# Patient Record
Sex: MALE | Race: BLACK OR AFRICAN AMERICAN | NOT HISPANIC OR LATINO | RURAL
[De-identification: names, ages, dates, MRNs, and addresses within clinical notes are randomized per-mention and may not be internally consistent; named-entity substitution may affect disease eponyms.]

---

## 2022-08-22 ENCOUNTER — HOSPITAL ENCOUNTER (EMERGENCY)
Facility: HOSPITAL | Age: 72
Discharge: HOME | End: 2022-08-22
Attending: FAMILY MEDICINE | Admitting: FAMILY MEDICINE
Payer: COMMERCIAL

## 2022-08-22 VITALS
HEIGHT: 68 IN | HEART RATE: 77 BPM | RESPIRATION RATE: 18 BRPM | OXYGEN SATURATION: 98 % | DIASTOLIC BLOOD PRESSURE: 95 MMHG | WEIGHT: 210 LBS | TEMPERATURE: 98 F | SYSTOLIC BLOOD PRESSURE: 140 MMHG | BODY MASS INDEX: 31.83 KG/M2
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
## 2022-08-22 PROCEDURE — 99282 EMERGENCY DEPT VISIT SF MDM: CPT | Mod: ,,, | Performed by: FAMILY MEDICINE

## 2022-08-22 PROCEDURE — 99281 EMR DPT VST MAYX REQ PHY/QHP: CPT

## 2022-08-22 PROCEDURE — 99282 PR EMERGENCY DEPT VISIT,LEVEL II: ICD-10-PCS | Mod: ,,, | Performed by: FAMILY MEDICINE

## 2022-08-22 RX ORDER — DAPAGLIFLOZIN 10 MG/1
10 TABLET, FILM COATED ORAL EVERY MORNING
COMMUNITY
Start: 2022-07-14

## 2022-08-22 RX ORDER — LISINOPRIL 40 MG/1
40 TABLET ORAL DAILY
COMMUNITY
Start: 2022-07-14

## 2022-08-22 RX ORDER — ROSUVASTATIN CALCIUM 40 MG/1
40 TABLET, COATED ORAL NIGHTLY
COMMUNITY
Start: 2022-07-14

## 2022-08-22 RX ORDER — INSULIN GLARGINE-YFGN 100 [IU]/ML
INJECTION, SOLUTION SUBCUTANEOUS
COMMUNITY
Start: 2022-05-25

## 2022-08-22 RX ORDER — NAPROXEN SODIUM 220 MG/1
81 TABLET, FILM COATED ORAL DAILY
COMMUNITY

## 2022-08-22 RX ORDER — METFORMIN HYDROCHLORIDE 1000 MG/1
1000 TABLET ORAL 2 TIMES DAILY
COMMUNITY
Start: 2022-05-25
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
## 2023-08-13 ENCOUNTER — HOSPITAL ENCOUNTER (EMERGENCY)
Facility: HOSPITAL | Age: 73
Discharge: HOME | End: 2023-08-13
Payer: COMMERCIAL

## 2023-08-13 VITALS
HEIGHT: 67 IN | DIASTOLIC BLOOD PRESSURE: 61 MMHG | RESPIRATION RATE: 16 BRPM | HEART RATE: 62 BPM | TEMPERATURE: 98 F | OXYGEN SATURATION: 98 % | WEIGHT: 205 LBS | SYSTOLIC BLOOD PRESSURE: 97 MMHG | BODY MASS INDEX: 32.18 KG/M2

## 2023-08-13 DIAGNOSIS — R55 SYNCOPE: ICD-10-CM

## 2023-08-13 DIAGNOSIS — E86.0 DEHYDRATION: Primary | ICD-10-CM

## 2023-08-13 LAB
ALBUMIN SERPL BCP-MCNC: 3.6 G/DL (ref 3.5–5)
ALBUMIN/GLOB SERPL: 0.9 {RATIO}
ALP SERPL-CCNC: 66 U/L (ref 45–115)
ALT SERPL W P-5'-P-CCNC: 17 U/L (ref 16–61)
ANION GAP SERPL CALCULATED.3IONS-SCNC: 20 MMOL/L (ref 7–16)
AST SERPL W P-5'-P-CCNC: 17 U/L (ref 15–37)
BASOPHILS # BLD AUTO: 0.02 K/UL (ref 0–0.2)
BASOPHILS NFR BLD AUTO: 0.3 % (ref 0–1)
BILIRUB SERPL-MCNC: 0.8 MG/DL (ref ?–1.2)
BUN SERPL-MCNC: 25 MG/DL (ref 7–18)
BUN/CREAT SERPL: 10 (ref 6–20)
CALCIUM SERPL-MCNC: 9.2 MG/DL (ref 8.5–10.1)
CHLORIDE SERPL-SCNC: 99 MMOL/L (ref 98–107)
CO2 SERPL-SCNC: 22 MMOL/L (ref 21–32)
CREAT SERPL-MCNC: 2.48 MG/DL (ref 0.7–1.3)
DIFFERENTIAL METHOD BLD: ABNORMAL
EGFR (NO RACE VARIABLE) (RUSH/TITUS): 27 ML/MIN/1.73M2
EOSINOPHIL # BLD AUTO: 0.02 K/UL (ref 0–0.5)
EOSINOPHIL NFR BLD AUTO: 0.3 % (ref 1–4)
ERYTHROCYTE [DISTWIDTH] IN BLOOD BY AUTOMATED COUNT: 13.7 % (ref 11.5–14.5)
GLOBULIN SER-MCNC: 3.9 G/DL (ref 2–4)
GLUCOSE SERPL-MCNC: 117 MG/DL (ref 70–105)
GLUCOSE SERPL-MCNC: 121 MG/DL (ref 74–106)
HCT VFR BLD AUTO: 40.7 % (ref 40–54)
HGB BLD-MCNC: 13.5 G/DL (ref 13.5–18)
LYMPHOCYTES # BLD AUTO: 2.38 K/UL (ref 1–4.8)
LYMPHOCYTES NFR BLD AUTO: 32.3 % (ref 27–41)
MAGNESIUM SERPL-MCNC: 2.1 MG/DL (ref 1.7–2.3)
MCH RBC QN AUTO: 29.5 PG (ref 27–31)
MCHC RBC AUTO-ENTMCNC: 33.2 G/DL (ref 32–36)
MCV RBC AUTO: 88.9 FL (ref 80–96)
MONOCYTES # BLD AUTO: 0.62 K/UL (ref 0–0.8)
MONOCYTES NFR BLD AUTO: 8.4 % (ref 2–6)
MPC BLD CALC-MCNC: 9.4 FL (ref 9.4–12.4)
NEUTROPHILS # BLD AUTO: 4.32 K/UL (ref 1.8–7.7)
NEUTROPHILS NFR BLD AUTO: 58.7 % (ref 53–65)
PLATELET # BLD AUTO: 252 K/UL (ref 150–400)
POTASSIUM SERPL-SCNC: 3.3 MMOL/L (ref 3.5–5.1)
PROT SERPL-MCNC: 7.5 G/DL (ref 6.4–8.2)
RBC # BLD AUTO: 4.58 M/UL (ref 4.6–6.2)
SODIUM SERPL-SCNC: 138 MMOL/L (ref 136–145)
TROPONIN I SERPL DL<=0.01 NG/ML-MCNC: 12.8 PG/ML
WBC # BLD AUTO: 7.36 K/UL (ref 4.5–11)

## 2023-08-13 PROCEDURE — 80053 COMPREHEN METABOLIC PANEL: CPT | Performed by: PHYSICIAN ASSISTANT

## 2023-08-13 PROCEDURE — 85025 COMPLETE CBC W/AUTO DIFF WBC: CPT | Performed by: PHYSICIAN ASSISTANT

## 2023-08-13 PROCEDURE — 83735 ASSAY OF MAGNESIUM: CPT | Performed by: PHYSICIAN ASSISTANT

## 2023-08-13 PROCEDURE — 84484 ASSAY OF TROPONIN QUANT: CPT | Performed by: PHYSICIAN ASSISTANT

## 2023-08-13 PROCEDURE — 93005 ELECTROCARDIOGRAM TRACING: CPT

## 2023-08-13 PROCEDURE — 25000003 PHARM REV CODE 250: Performed by: PHYSICIAN ASSISTANT

## 2023-08-13 PROCEDURE — 99284 PR EMERGENCY DEPT VISIT,LEVEL IV: ICD-10-PCS | Mod: ,,, | Performed by: PHYSICIAN ASSISTANT

## 2023-08-13 PROCEDURE — 99285 EMERGENCY DEPT VISIT HI MDM: CPT | Mod: 25

## 2023-08-13 PROCEDURE — 93010 ELECTROCARDIOGRAM REPORT: CPT | Mod: ,,, | Performed by: FAMILY MEDICINE

## 2023-08-13 PROCEDURE — 82962 GLUCOSE BLOOD TEST: CPT

## 2023-08-13 PROCEDURE — 99284 EMERGENCY DEPT VISIT MOD MDM: CPT | Mod: ,,, | Performed by: PHYSICIAN ASSISTANT

## 2023-08-13 PROCEDURE — 96360 HYDRATION IV INFUSION INIT: CPT

## 2023-08-13 PROCEDURE — 93010 EKG 12-LEAD: ICD-10-PCS | Mod: ,,, | Performed by: FAMILY MEDICINE

## 2023-08-13 RX ADMIN — SODIUM CHLORIDE 1000 ML: 9 INJECTION, SOLUTION INTRAVENOUS at 08:08

## 2023-08-14 NOTE — ED PROVIDER NOTES
Encounter Date: 8/13/2023       History     Chief Complaint   Patient presents with    Loss of Consciousness     Patient is a 72-year-old male with history of syncopal episode, he had stayed out in the heat for quite a while talking to a friend, once they got home, he stepped out of the car and fell to the ground.    He did not injure himself, denies any pain.    Two states that he feels weak, and is probably dehydrated.    He has no history of heart disease, or neurologic disorder, his wife witnessed the syncopal episode, denied any seizure activity, said that he came too quickly, and was not confused.    He has a past medical history of diabetes, hypertension, bladder cancer.    Past surgical history is positive for bladder surgery  Patient denies any tobacco, alcohol, or drug use      Review of patient's allergies indicates:  No Known Allergies  Past Medical History:   Diagnosis Date    Bladder cancer     Diabetes mellitus     Hypertension      Past Surgical History:   Procedure Laterality Date    BLADDER SURGERY  2022     History reviewed. No pertinent family history.  Social History     Tobacco Use    Smoking status: Never    Smokeless tobacco: Never   Substance Use Topics    Alcohol use: Not Currently    Drug use: Never     Review of Systems   Neurological:  Positive for syncope.   All other systems reviewed and are negative.      Physical Exam     Initial Vitals   BP Pulse Resp Temp SpO2   08/13/23 1927 08/13/23 1927 08/13/23 1927 08/13/23 2003 08/13/23 1927   (!) 95/51 78 18 98.2 °F (36.8 °C) 95 %      MAP       --                Physical Exam    Nursing note and vitals reviewed.  Constitutional: He appears well-developed and well-nourished. He is not diaphoretic. No distress.   HENT:   Head: Normocephalic and atraumatic.   Nose: Nose normal.   Mouth/Throat: Oropharynx is clear and moist.   Eyes: EOM are normal.   Neck: Neck supple.   Cardiovascular:  Normal rate, regular rhythm and normal heart sounds.            Pulmonary/Chest: Breath sounds normal.   Abdominal: Abdomen is soft. Bowel sounds are normal. There is no abdominal tenderness.   Musculoskeletal:         General: No edema.      Cervical back: Neck supple.     Neurological: He is alert and oriented to person, place, and time.   Skin: Skin is dry.   Psychiatric: He has a normal mood and affect.         Medical Screening Exam   See Full Note    ED Course   Procedures  Labs Reviewed   COMPREHENSIVE METABOLIC PANEL - Abnormal; Notable for the following components:       Result Value    Potassium 3.3 (*)     Anion Gap 20 (*)     Glucose 121 (*)     BUN 25 (*)     Creatinine 2.48 (*)     eGFR 27 (*)     All other components within normal limits   CBC WITH DIFFERENTIAL - Abnormal; Notable for the following components:    RBC 4.58 (*)     Monocytes % 8.4 (*)     Eosinophils % 0.3 (*)     All other components within normal limits   POCT GLUCOSE MONITORING CONTINUOUS - Abnormal; Notable for the following components:    POC Glucose 117 (*)     All other components within normal limits   TROPONIN I - Normal   MAGNESIUM - Normal   CBC W/ AUTO DIFFERENTIAL    Narrative:     The following orders were created for panel order CBC auto differential.  Procedure                               Abnormality         Status                     ---------                               -----------         ------                     CBC with Differential[[id number]]        Abnormal            Final result                 Please view results for these tests on the individual orders.          Imaging Results              X-Ray Chest AP Portable (Final result)  Result time 08/13/23 20:04:24      Final result by Aleksey Eckert MD (08/13/23 20:04:24)                   Impression:      No acute findings.      Electronically signed by: Aleksey Eckert  Date:    08/13/2023  Time:    20:04               Narrative:    EXAMINATION:  XR CHEST AP PORTABLE    CLINICAL HISTORY:  Syncope;    TECHNIQUE:  Single  frontal view of the chest was performed.    COMPARISON:  11/07/2017    FINDINGS:  Heart size normal. Lungs clear. No pneumothorax or pleural effusion.                                       CT Head Without Contrast (Final result)  Result time 08/13/23 19:59:56      Final result by Aleksey Eckert MD (08/13/23 19:59:56)                   Impression:      No acute intracranial abnormality.      Electronically signed by: Aleksey Eckert  Date:    08/13/2023  Time:    19:59               Narrative:    EXAMINATION:  CT HEAD WITHOUT CONTRAST    CLINICAL HISTORY:  Syncope, simple, abnormal neuro exam;Syncope;    TECHNIQUE:  CT of the head performed without the use of intravenous contrast.  The CT examination was performed using one or more of the following dose reduction techniques: Automated exposure control, adjustment of the mA and kV according to patient's size, use of acute or iterative reconstruction techniques.    COMPARISON:  None.    FINDINGS:  No acute intracranial hemorrhage, mass, infarct, or fluid collection.  Mild chronic microvascular ischemic change.  Ventricles, sulci, and cisterns appear normal.  No mass effect or midline shift.  Calvarium intact.  Mastoid air cells and paranasal sinuses clear.                                       Medications   sodium chloride 0.9% bolus 1,000 mL 1,000 mL (1,000 mLs Intravenous New Bag 8/13/23 2001)     Medical Decision Making:   Initial Assessment:   Patient is a 72-year-old male with history of syncopal episode, he had stayed out in the heat for quite a while talking to a friend, once they got home, he stepped out of the car and fell to the ground.    He did not injure himself, denies any pain.    Two states that he feels weak, and is probably dehydrated.    He has no history of heart disease, or neurologic disorder, his wife witnessed the syncopal episode, denied any seizure activity, said that he came too quickly, and was not confused.    He has a past medical history of  diabetes, hypertension, bladder cancer.    Past surgical history is positive for bladder surgery  Patient denies any tobacco, alcohol, or drug use  Differential Diagnosis:   Dehydration, NSTEMI, syncope, stroke, brain bleed  ED Management:  Patient got a CBC, CMP, troponin, magnesium, chest x-ray, head CT without contrast.    He got an IV, and a bolus of normal saline    His workup was negative other than elevated BUN and creatinine, he states that his creatinine is elevated chronically.      I will discharge patient home, with strict instructions to return if any new or worsening symptoms arise.    He was instructed not to drive a vehicle, or get on any high ladders, etc. until evaluated by Neurology, and Cardiology.    Provider in the morning to get referrals to Cardiology and Neurology                         Clinical Impression:   Final diagnoses:  [R55] Syncope  [E86.0] Dehydration (Primary)               Cisco Kenney PA  08/13/23 2020       Cisco Kenney PA  08/13/23 2035       Cisco Kenney PA  08/13/23 2038

## 2023-08-14 NOTE — ED TRIAGE NOTES
"Patient's spouse reports patient fell from standing position after "passing out" approx 6pm tonight. States patient's eyes were open but patient was not responsive for approx 3-4 minutes. Patient was able to get up with assistance after lying on concrete for approx 5 minutes. Spouse reports blood glucose was 142 and blood pressure was 50/30. Patient has been outside in high temps for past two days.  "

## 2023-08-14 NOTE — DISCHARGE INSTRUCTIONS
Return to the ER if any new or worsening symptoms arise.  Follow-up with your primary care provider to get referral to Cardiology and Neurology

## 2023-08-19 ENCOUNTER — HOSPITAL ENCOUNTER (EMERGENCY)
Facility: HOSPITAL | Age: 73
Discharge: HOME | End: 2023-08-19
Attending: EMERGENCY MEDICINE
Payer: COMMERCIAL

## 2023-08-19 VITALS
DIASTOLIC BLOOD PRESSURE: 80 MMHG | RESPIRATION RATE: 18 BRPM | TEMPERATURE: 99 F | HEIGHT: 67 IN | SYSTOLIC BLOOD PRESSURE: 127 MMHG | WEIGHT: 205 LBS | OXYGEN SATURATION: 97 % | BODY MASS INDEX: 32.18 KG/M2 | HEART RATE: 89 BPM

## 2023-08-19 DIAGNOSIS — L02.91 ABSCESS: Primary | ICD-10-CM

## 2023-08-19 PROCEDURE — 10060 I&D ABSCESS SIMPLE/SINGLE: CPT

## 2023-08-19 PROCEDURE — 10060 I&D ABSCESS SIMPLE/SINGLE: CPT | Mod: ,,, | Performed by: EMERGENCY MEDICINE

## 2023-08-19 PROCEDURE — 10060 PR DRAIN SKIN ABSCESS SIMPLE: ICD-10-PCS | Mod: ,,, | Performed by: EMERGENCY MEDICINE

## 2023-08-19 PROCEDURE — 25000003 PHARM REV CODE 250: Performed by: EMERGENCY MEDICINE

## 2023-08-19 PROCEDURE — 87070 CULTURE OTHR SPECIMN AEROBIC: CPT | Performed by: EMERGENCY MEDICINE

## 2023-08-19 PROCEDURE — 99284 PR EMERGENCY DEPT VISIT,LEVEL IV: ICD-10-PCS | Mod: 25,,, | Performed by: EMERGENCY MEDICINE

## 2023-08-19 PROCEDURE — 99283 EMERGENCY DEPT VISIT LOW MDM: CPT | Mod: 25

## 2023-08-19 PROCEDURE — 99284 EMERGENCY DEPT VISIT MOD MDM: CPT | Mod: 25,,, | Performed by: EMERGENCY MEDICINE

## 2023-08-19 RX ORDER — CLINDAMYCIN HYDROCHLORIDE 300 MG/1
300 CAPSULE ORAL 4 TIMES DAILY
Qty: 28 CAPSULE | Refills: 0 | Status: SHIPPED | OUTPATIENT
Start: 2023-08-19 | End: 2023-08-26

## 2023-08-19 RX ORDER — LIDOCAINE HYDROCHLORIDE 10 MG/ML
10 INJECTION INFILTRATION; PERINEURAL
Status: COMPLETED | OUTPATIENT
Start: 2023-08-19 | End: 2023-08-19

## 2023-08-19 RX ADMIN — LIDOCAINE HYDROCHLORIDE 10 ML: 10 INJECTION, SOLUTION INFILTRATION; PERINEURAL at 11:08

## 2023-08-19 NOTE — DISCHARGE INSTRUCTIONS
INCREASE REST AND FLUIDS  MEDICATIONS AS DIRECTED  FOLLOW UP MONDAY 0900 DR BRYAN; IF WORSE SOONER GO TO [facility]  NOTHING  TO EAT AFTER MIDNIGHT MONDAY  CHANGE DRESSING TWICE A DAY  PROBIOTIC FOR 2 WEEKS

## 2023-08-19 NOTE — ED PROVIDER NOTES
Encounter Date: 8/19/2023       History     Chief Complaint   Patient presents with    Abscess     Left axilla     PT IS A 72 YR  WITH ABSCESS R AXILLA ONSET 3 DAYS AGO. PT HAS DM2 WITH GLU > 120 THIS AM, PT HAS NOT EATEN BREAKFAST THIS AM AND USUALLY EATS LATER IN THE DAY ACCORDING   PT DENIES FEVER,CHILLS, N/V/D OR ADDITIONAL  SYMPTOMS   PT HAD ABSCESSES OF BUTTOCKS AS A CHILD, NONE AS AN ADULT  PT  IS ON FARXIGA, INSULIN GLARGINE AND METFORMIN FOR DM2      Review of patient's allergies indicates:  No Known Allergies  Past Medical History:   Diagnosis Date    Bladder cancer     Diabetes mellitus     Hypertension      Past Surgical History:   Procedure Laterality Date    BLADDER SURGERY  2022     No family history on file.  Social History     Tobacco Use    Smoking status: Never    Smokeless tobacco: Never   Substance Use Topics    Alcohol use: Not Currently    Drug use: Never     Review of Systems   Constitutional: Negative.  Negative for appetite change, chills and fever.   HENT: Negative.     Eyes: Negative.    Cardiovascular: Negative.    Gastrointestinal: Negative.    Endocrine: Negative.    Musculoskeletal: Negative.    Skin:         ABSCESS R AXILLA   Allergic/Immunologic: Positive for immunocompromised state (DM2).   Neurological: Negative.    Hematological: Negative.    Psychiatric/Behavioral: Negative.         Physical Exam     Initial Vitals [08/19/23 1139]   BP Pulse Resp Temp SpO2   127/80 89 18 98.6 °F (37 °C) 97 %      MAP       --         Physical Exam    Nursing note and vitals reviewed.  Constitutional: He appears well-developed and well-nourished. He is cooperative. He appears distressed.   HENT:   Head: Normocephalic and atraumatic.   Right Ear: External ear normal.   Left Ear: External ear normal.   Nose: Nose normal.   Mouth/Throat: Oropharynx is clear and moist.   Eyes: Conjunctivae and EOM are normal. Pupils are equal, round, and reactive to light.   Neck: Trachea normal. Neck supple.    Normal range of motion.  Cardiovascular:  Regular rhythm, normal heart sounds and intact distal pulses.     Exam reveals no gallop and no friction rub.       No murmur heard.  Pulses:       Radial pulses are 3+ on the right side and 3+ on the left side.        Dorsalis pedis pulses are 3+ on the right side and 3+ on the left side.   Pulmonary/Chest: Breath sounds normal. No respiratory distress. He has no wheezes. He has no rhonchi. He has no rales.   Abdominal: Abdomen is soft. Bowel sounds are normal. He exhibits no distension. There is no abdominal tenderness.   Musculoskeletal:         General: Tenderness (R AXILLA) present. Normal range of motion.      Right shoulder: Normal.      Left shoulder: Normal.      Right upper arm: Normal.      Left upper arm: Normal.      Right elbow: Normal.      Left elbow: Normal.      Right forearm: Normal.      Left forearm: Normal.      Right wrist: Normal.      Left wrist: Normal.      Right hand: Normal.      Left hand: Normal.      Cervical back: Normal range of motion and neck supple.     Lymphadenopathy:     He has no cervical adenopathy.     He has no axillary adenopathy.   Neurological: He is alert and oriented to person, place, and time. He has normal strength. No cranial nerve deficit or sensory deficit. He displays a negative Romberg sign. GCS eye subscore is 4. GCS verbal subscore is 5. GCS motor subscore is 6.   Reflex Scores:       Brachioradialis reflexes are 2+ on the right side and 2+ on the left side.       Patellar reflexes are 2+ on the right side and 2+ on the left side.  Skin: Skin is warm and dry. Capillary refill takes less than 2 seconds. Abscess (ABSCESS WITH AREA OF INDURATION MEASURING 14 X 7 CM) noted.   Psychiatric: He has a normal mood and affect. His speech is normal and behavior is normal. Judgment and thought content normal. Cognition and memory are normal.         Medical Screening Exam   See Full Note    ED Course   I & D - Incision and  "Drainage    Date/Time: 8/19/2023 12:42 PM  Location procedure was performed: [facility] ACC[facility]    Performed by: Bing Carlos MD  Authorized by: Bing Carlos MD  Pre-operative diagnosis: ABSCESS  Post-operative diagnosis: ABSCESS  Consent Done: Yes  Consent: Written consent obtained.  Consent given by: patient  Patient understanding: patient states understanding of the procedure being performed  Patient consent: the patient's understanding of the procedure matches consent given  Procedure consent: procedure consent matches procedure scheduled  Relevant documents: relevant documents present and verified  Test results: test results available and properly labeled  Site marked: the operative site was marked  Imaging studies: imaging studies not available  Patient identity confirmed: name and verbally with patient  Time out: Immediately prior to procedure a "time out" was called to verify the correct patient, procedure, equipment, support staff and site/side marked as required.  Type: abscess  Body area: upper extremity  Anesthesia: local infiltration    Anesthesia:  Local Anesthetic: lidocaine 1% without epinephrine  Anesthetic total: 10 mL    Patient sedated: no  Description of findings: BLOODY PURULENT FLUID   Risk factor: underlying major vessel, underlying major nerve and coagulopathy  Scalpel size: 11  Incision type: single straight  Incision depth: subcutaneous  Complexity: simple  Drainage: pus and bloody  Wound treatment: incision, wound left open, expression of material and wound packed  Packing material: 1/2 in gauze  Complications: No  Estimated blood loss (mL): 2  Specimens: Yes (WOUND CULTURE)  Patient tolerance: Patient tolerated the procedure well with no immediate complications  Comments: TOLERATED WELL    Incision depth: subcutaneous        Labs Reviewed   CULTURE, WOUND          Imaging Results    None          Medications   LIDOcaine HCL 10 mg/ml (1%) injection 10 mL (10 mLs " Other Given 8/19/23 1159)     Medical Decision Making  PT IS A 72 YR  WITH ABSCESS R AXILLA ONSET 3 DAYS AGO. PT HAS DM2 WITH GLU > 120 THIS AM, PT HAS NOT EATEN BREAKFAST THIS AM AND USUALLY EATS LATER IN THE DAY  PT DENIES FEVER,CHILLS, N/V/D OR ADDITIONAL  SYMPTOMS   PT HAD ABSCESSES OF BUTTOCKS AS A CHILD  PT  IS ON FARXIGA, INSULIN GLARGINE AND METFORMIN FOR DM2    Amount and/or Complexity of Data Reviewed  Independent Historian: spouse     Details: ADDS  TO HX  Labs: ordered.     Details: WOUND CULTURE  Discussion of management or test interpretation with external provider(s): EXAM  I AND D   SPOKE WITH DR BRYAN  DC ON CLINDAMYCIN    Risk  Prescription drug management.  Risk Details: DC HOME  TO FOLLOW UP MONDAY (2 D ) WITH DR BRYAN AT 0900                          Medical Decision Making:   History:   I obtained history from: someone other than patient.       <> Summary of History: SPOUSE  Initial Assessment:   PT IS A 72 YR  WITH ABSCESS R AXILLA ONSET 3 DAYS AGO. PT HAS DM2 WITH GLU > 120 THIS AM, PT HAS NOT EATEN BREAKFAST THIS AM AND USUALLY EATS LATER IN THE DAY  PT DENIES FEVER,CHILLS, N/V/D OR ADDITIONAL  SYMPTOMS   PT HAD ABSCESSES OF BUTTOCKS AS A CHILD  PT  IS ON FARXIGA, INSULIN GLARGINE AND METFORMIN FOR DM2  Differential Diagnosis:   ABSCESS  MRSA  Clinical Tests:   Lab Tests: Ordered  ED Management:  EXAM  I AND D  DC HOME  Other:   I have discussed this case with another health care provider.       <> Summary of the Discussion: DR BRYAN WILL FOLLOW UP MONDAY 2 DAYS AT 0900      INCREASE REST AND FLUIDS  MEDICATIONS AS DIRECTED  FOLLOW UP MONDAY 0900 DR BRYAN; IF WORSE SOONER GO TO [facility]  NOTHING  TO EAT AFTER MIDNIGHT MONDAY  CHANGE DRESSING TWICE A DAY  PROBIOTIC FOR 2 WEEKS      Clinical Impression:   Final diagnoses:  [L02.91] Abscess (Primary)        ED Disposition Condition    Discharge Stable          ED Prescriptions       Medication Sig Dispense Start Date End Date Auth.  Provider    clindamycin (CLEOCIN) 300 MG capsule Take 1 capsule (300 mg total) by mouth 4 (four) times daily. for 7 days 28 capsule 8/19/2023 8/26/2023 Bing Carlos MD          Follow-up Information       Follow up With Specialties Details Why Contact Info    Rafa Velazquez,  General Surgery, Surgery In 2 days 0900 [address] [facility] [id number] [phone]               Bing Carlos MD  08/19/23 6635

## 2023-08-21 ENCOUNTER — OFFICE VISIT (OUTPATIENT)
Dept: SURGERY | Facility: CLINIC | Age: 73
End: 2023-08-21
Payer: COMMERCIAL

## 2023-08-21 VITALS
HEIGHT: 68 IN | BODY MASS INDEX: 31.17 KG/M2 | SYSTOLIC BLOOD PRESSURE: 115 MMHG | DIASTOLIC BLOOD PRESSURE: 72 MMHG | HEART RATE: 68 BPM | RESPIRATION RATE: 18 BRPM | OXYGEN SATURATION: 98 %

## 2023-08-21 DIAGNOSIS — L02.91 ABSCESS: Primary | ICD-10-CM

## 2023-08-21 LAB — MICROORGANISM SPEC CULT: ABNORMAL

## 2023-08-21 PROCEDURE — 3008F PR BODY MASS INDEX (BMI) DOCUMENTED: ICD-10-PCS | Mod: S$GLB,,, | Performed by: STUDENT IN AN ORGANIZED HEALTH CARE EDUCATION/TRAINING PROGRAM

## 2023-08-21 PROCEDURE — 4010F PR ACE/ARB THEARPY RXD/TAKEN: ICD-10-PCS | Mod: S$GLB,,, | Performed by: STUDENT IN AN ORGANIZED HEALTH CARE EDUCATION/TRAINING PROGRAM

## 2023-08-21 PROCEDURE — 3078F PR MOST RECENT DIASTOLIC BLOOD PRESSURE < 80 MM HG: ICD-10-PCS | Mod: S$GLB,,, | Performed by: STUDENT IN AN ORGANIZED HEALTH CARE EDUCATION/TRAINING PROGRAM

## 2023-08-21 PROCEDURE — 99214 OFFICE O/P EST MOD 30 MIN: CPT | Mod: PBBFAC | Performed by: STUDENT IN AN ORGANIZED HEALTH CARE EDUCATION/TRAINING PROGRAM

## 2023-08-21 PROCEDURE — 87070 CULTURE OTHR SPECIMN AEROBIC: CPT | Mod: ,,, | Performed by: CLINICAL MEDICAL LABORATORY

## 2023-08-21 PROCEDURE — 3078F DIAST BP <80 MM HG: CPT | Mod: S$GLB,,, | Performed by: STUDENT IN AN ORGANIZED HEALTH CARE EDUCATION/TRAINING PROGRAM

## 2023-08-21 PROCEDURE — 87075 CULTR BACTERIA EXCEPT BLOOD: CPT | Mod: ,,, | Performed by: CLINICAL MEDICAL LABORATORY

## 2023-08-21 PROCEDURE — 87070 CULTURE, WOUND: ICD-10-PCS | Mod: ,,, | Performed by: CLINICAL MEDICAL LABORATORY

## 2023-08-21 PROCEDURE — 1125F PR PAIN SEVERITY QUANTIFIED, PAIN PRESENT: ICD-10-PCS | Mod: S$GLB,,, | Performed by: STUDENT IN AN ORGANIZED HEALTH CARE EDUCATION/TRAINING PROGRAM

## 2023-08-21 PROCEDURE — 87186 SC STD MICRODIL/AGAR DIL: CPT | Mod: ,,, | Performed by: CLINICAL MEDICAL LABORATORY

## 2023-08-21 PROCEDURE — 3074F SYST BP LT 130 MM HG: CPT | Mod: S$GLB,,, | Performed by: STUDENT IN AN ORGANIZED HEALTH CARE EDUCATION/TRAINING PROGRAM

## 2023-08-21 PROCEDURE — 99204 OFFICE O/P NEW MOD 45 MIN: CPT | Mod: 25,S$GLB,, | Performed by: STUDENT IN AN ORGANIZED HEALTH CARE EDUCATION/TRAINING PROGRAM

## 2023-08-21 PROCEDURE — 10061 PR DRAIN SKIN ABSCESS COMPLIC: ICD-10-PCS | Mod: S$GLB,,, | Performed by: STUDENT IN AN ORGANIZED HEALTH CARE EDUCATION/TRAINING PROGRAM

## 2023-08-21 PROCEDURE — 10061 I&D ABSCESS COMP/MULTIPLE: CPT | Mod: S$GLB,,, | Performed by: STUDENT IN AN ORGANIZED HEALTH CARE EDUCATION/TRAINING PROGRAM

## 2023-08-21 PROCEDURE — 3074F PR MOST RECENT SYSTOLIC BLOOD PRESSURE < 130 MM HG: ICD-10-PCS | Mod: S$GLB,,, | Performed by: STUDENT IN AN ORGANIZED HEALTH CARE EDUCATION/TRAINING PROGRAM

## 2023-08-21 PROCEDURE — 99204 PR OFFICE/OUTPT VISIT, NEW, LEVL IV, 45-59 MIN: ICD-10-PCS | Mod: 25,S$GLB,, | Performed by: STUDENT IN AN ORGANIZED HEALTH CARE EDUCATION/TRAINING PROGRAM

## 2023-08-21 PROCEDURE — 87077 CULTURE, WOUND: ICD-10-PCS | Mod: ,,, | Performed by: CLINICAL MEDICAL LABORATORY

## 2023-08-21 PROCEDURE — 1125F AMNT PAIN NOTED PAIN PRSNT: CPT | Mod: S$GLB,,, | Performed by: STUDENT IN AN ORGANIZED HEALTH CARE EDUCATION/TRAINING PROGRAM

## 2023-08-21 PROCEDURE — 1159F PR MEDICATION LIST DOCUMENTED IN MEDICAL RECORD: ICD-10-PCS | Mod: S$GLB,,, | Performed by: STUDENT IN AN ORGANIZED HEALTH CARE EDUCATION/TRAINING PROGRAM

## 2023-08-21 PROCEDURE — 4010F ACE/ARB THERAPY RXD/TAKEN: CPT | Mod: S$GLB,,, | Performed by: STUDENT IN AN ORGANIZED HEALTH CARE EDUCATION/TRAINING PROGRAM

## 2023-08-21 PROCEDURE — 87186 CULTURE, WOUND: ICD-10-PCS | Mod: ,,, | Performed by: CLINICAL MEDICAL LABORATORY

## 2023-08-21 PROCEDURE — 87077 CULTURE AEROBIC IDENTIFY: CPT | Mod: ,,, | Performed by: CLINICAL MEDICAL LABORATORY

## 2023-08-21 PROCEDURE — 87075 CULTURE, ANAEROBE: ICD-10-PCS | Mod: ,,, | Performed by: CLINICAL MEDICAL LABORATORY

## 2023-08-21 PROCEDURE — 3008F BODY MASS INDEX DOCD: CPT | Mod: S$GLB,,, | Performed by: STUDENT IN AN ORGANIZED HEALTH CARE EDUCATION/TRAINING PROGRAM

## 2023-08-21 PROCEDURE — 1159F MED LIST DOCD IN RCRD: CPT | Mod: S$GLB,,, | Performed by: STUDENT IN AN ORGANIZED HEALTH CARE EDUCATION/TRAINING PROGRAM

## 2023-08-21 NOTE — PROGRESS NOTES
History & Physical    SUBJECTIVE:     History of Present Illness:  72-year-old  male presents the clinic for evaluation for right axillary abscess.  Patient states this started Wednesday and started having increased swelling.  This is never happened before.  Patient went to primary care physician where he had this lanced and drained over the weekend; started on clindamycin; I was notified by the primary care physician they were concerned this was much bigger and that they had not adequately drained it and wanted the patient to come see myself in clinic.  Denies any chest pain/shortness of breath, nausea/vomiting/diarrhea, fever/chills.    Chief Complaint   Patient presents with    Abscess     Patient is here for an abscess removal, patient was seen at the ER over the weekend the abscess was drained and packed however it is still red, swollen and patient does say he is in pain. Patient was given clindamycin over the weekend and stated it does help.        Review of patient's allergies indicates:  No Known Allergies    Current Outpatient Medications   Medication Sig Dispense Refill    aspirin 81 MG Chew Take 81 mg by mouth once daily.      clindamycin (CLEOCIN) 300 MG capsule Take 1 capsule (300 mg total) by mouth 4 (four) times daily. for 7 days 28 capsule 0    FARXIGA 10 mg tablet Take 10 mg by mouth every morning.      insulin glargine-yfgn 100 unit/mL (3 mL) InPn SMARTSI Unit(s) SUB-Q Every Night      lisinopriL (PRINIVIL,ZESTRIL) 40 MG tablet Take 40 mg by mouth once daily.      metFORMIN (GLUCOPHAGE) 1000 MG tablet Take 1,000 mg by mouth 2 (two) times daily.      rosuvastatin (CRESTOR) 40 MG Tab Take 40 mg by mouth nightly.       No current facility-administered medications for this visit.       Past Medical History:   Diagnosis Date    Bladder cancer     Diabetes mellitus     Hypertension      Past Surgical History:   Procedure Laterality Date    BLADDER SURGERY       History reviewed.  "No pertinent family history.  Social History     Tobacco Use    Smoking status: Never    Smokeless tobacco: Never   Substance Use Topics    Alcohol use: Not Currently    Drug use: Never        Review of Systems:  Review of Systems   Constitutional: Negative.  Negative for appetite change, chills, fever and unexpected weight change.   HENT: Negative.  Negative for trouble swallowing.    Eyes: Negative.    Respiratory: Negative.  Negative for chest tightness and shortness of breath.    Cardiovascular: Negative.  Negative for chest pain.   Gastrointestinal: Negative.  Negative for abdominal distention, abdominal pain, blood in stool and nausea.   Endocrine: Negative.    Genitourinary: Negative.  Negative for hematuria.   Musculoskeletal: Negative.  Negative for back pain and myalgias.   Skin: Negative.  Negative for color change.   Allergic/Immunologic: Negative.    Neurological: Negative.  Negative for dizziness, syncope, weakness and light-headedness.   Psychiatric/Behavioral: Negative.  Negative for agitation.        OBJECTIVE:     Vital Signs (Most Recent)  Pulse: 68 (08/21/23 1024)  Resp: 18 (08/21/23 1024)  BP: 115/72 (08/21/23 1024)  SpO2: 98 % (08/21/23 1024)  5' 8" (1.727 m)        Physical Exam:  Physical Exam  Constitutional:       General: He is not in acute distress.     Appearance: Normal appearance.   HENT:      Head: Normocephalic.   Cardiovascular:      Rate and Rhythm: Normal rate.   Pulmonary:      Effort: Pulmonary effort is normal. No respiratory distress.   Abdominal:      General: There is no distension.      Tenderness: There is no abdominal tenderness.   Musculoskeletal:         General: Normal range of motion.        Arms:       Comments: Right axillary abscess/induration/cellulitis; 1.5 cm incision with packing in place; bloody drainage   Skin:     General: Skin is warm.      Coloration: Skin is not jaundiced.   Neurological:      General: No focal deficit present.      Mental Status: He is " alert and oriented to person, place, and time.      Cranial Nerves: No cranial nerve deficit.         ASSESSMENT/PLAN:     Right axillary abscess    PLAN:Plan     We will perform incision and drainage of right axillary abscess today.  Continue antibiotics.  We will follow-up the cultures.  Follow back up in 2 weeks

## 2023-08-21 NOTE — PROGRESS NOTES
Incision and drainage    Pre-operative Diagnosis:  Right axillary abscess    Post-operative Diagnosis: same    Locations:  Right axilla    Indications:  Abscess    Anesthesia: Lidocaine 1% without epinephrine without added sodium bicarbonate    Procedure Details   The risks, benefits, indications, potential complications, and alternatives were explained to the patient and informed consent obtained.    Patient was taken to the procedure room and placed on the procedure table in the supine position with the right arm extended.  Right axilla prepped and draped in usual sterile fashion.  We localize with 1% lidocaine plain.  We then made a 2 cm cruciate incision with a 11 blade scalpel over the previous area.  We probed the cavity with hemostats and broke up loculations; we did find a small amount of purulent drainage, but no large abscess pocket.  Most the tissue was firm and indurated.  We did take cultures and sent this to microbiology.  We irrigated the cavity with Vashe solution and then packed with quarter-inch iodoform packing and placed sterile dressing.  Bleeding was controlled with pressure dressing.  Patient tolerated procedure well    EBL: minimal    Findings:  As above    Condition: Stable    Complications:  None

## 2023-08-23 LAB — MICROORGANISM SPEC CULT: ABNORMAL

## 2023-08-24 LAB — BACTERIA SPEC ANAEROBE CULT: NORMAL

## 2024-06-18 ENCOUNTER — HOSPITAL ENCOUNTER (EMERGENCY)
Facility: HOSPITAL | Age: 74
Discharge: HOME | End: 2024-06-19
Payer: COMMERCIAL

## 2024-06-18 DIAGNOSIS — U07.1 COVID: Primary | ICD-10-CM

## 2024-06-18 PROCEDURE — 99284 EMERGENCY DEPT VISIT MOD MDM: CPT | Mod: ,,,

## 2024-06-18 PROCEDURE — 99282 EMERGENCY DEPT VISIT SF MDM: CPT

## 2024-06-18 PROCEDURE — 87651 STREP A DNA AMP PROBE: CPT

## 2024-06-18 PROCEDURE — 87635 SARS-COV-2 COVID-19 AMP PRB: CPT

## 2024-06-18 PROCEDURE — 87502 INFLUENZA DNA AMP PROBE: CPT

## 2024-06-19 VITALS
HEIGHT: 69 IN | BODY MASS INDEX: 29.47 KG/M2 | OXYGEN SATURATION: 96 % | TEMPERATURE: 98 F | WEIGHT: 199 LBS | SYSTOLIC BLOOD PRESSURE: 157 MMHG | DIASTOLIC BLOOD PRESSURE: 92 MMHG | RESPIRATION RATE: 18 BRPM | HEART RATE: 70 BPM

## 2024-06-19 DIAGNOSIS — U07.1 COVID-19 VIRUS DETECTED: ICD-10-CM

## 2024-06-19 LAB
GROUP A STREP MOLECULAR (OHS): NEGATIVE
INFLUENZA A MOLECULAR (OHS): NEGATIVE
INFLUENZA B MOLECULAR (OHS): NEGATIVE
SARS-COV-2 RDRP RESP QL NAA+PROBE: POSITIVE

## 2024-06-19 NOTE — ED PROVIDER NOTES
Encounter Date: 6/18/2024       History     Chief Complaint   Patient presents with    Cough     Cough for one day with sore throat that started this evening.      73-year-old  or black male reports a sore throat and nonproductive cough since this evening.  He is requesting to be checked for COVID.  He denies fever, chills, shortness and breath, chest pain, nausea, vomiting, diarrhea, abdominal pain, dysuria, hematuria.  He is able speak in clear and complete sentences without any difficulty.  Vital signs stable and he is in no acute distress at this time. PMH includes diabetes, hypertension, and bladder cancer.  Denies smoking cigarettes or drinking alcohol.        The history is provided by the patient.     Review of patient's allergies indicates:  No Known Allergies  Past Medical History:   Diagnosis Date    Bladder cancer     Diabetes mellitus     Hypertension      Past Surgical History:   Procedure Laterality Date    BLADDER SURGERY  2022     No family history on file.  Social History     Tobacco Use    Smoking status: Never    Smokeless tobacco: Never   Substance Use Topics    Alcohol use: Not Currently    Drug use: Never     Review of Systems   Constitutional:  Negative for appetite change, chills, fatigue and fever.   HENT:  Positive for sore throat. Negative for congestion, rhinorrhea, tinnitus and trouble swallowing.    Eyes:  Negative for visual disturbance.   Respiratory:  Positive for cough. Negative for shortness of breath.    Cardiovascular:  Negative for chest pain, palpitations and leg swelling.   Gastrointestinal:  Negative for abdominal pain, constipation, diarrhea, nausea and vomiting.   Genitourinary:  Negative for dysuria, flank pain, frequency and hematuria.   Musculoskeletal:  Negative for neck pain and neck stiffness.   Skin:  Negative for rash.   Neurological:  Negative for syncope, weakness and headaches.   Hematological:  Does not bruise/bleed easily.    Psychiatric/Behavioral:  Negative for suicidal ideas.        Physical Exam     Initial Vitals [06/18/24 2335]   BP Pulse Resp Temp SpO2   (!) 165/100 68 18 97.5 °F (36.4 °C) 97 %      MAP       --         Physical Exam    Nursing note and vitals reviewed.  Constitutional: He appears well-developed and well-nourished. No distress.   HENT:   Head: Normocephalic.   Right Ear: External ear normal.   Left Ear: External ear normal.   Mouth/Throat: Oropharynx is clear and moist. No oropharyngeal exudate.   Eyes: Conjunctivae and EOM are normal. Pupils are equal, round, and reactive to light.   Neck:   Normal range of motion.  Cardiovascular:  Normal rate, regular rhythm, normal heart sounds and intact distal pulses.           No murmur heard.  Pulmonary/Chest: Breath sounds normal. No respiratory distress. He has no wheezes.   Abdominal: Abdomen is soft. Bowel sounds are normal. He exhibits no distension. There is no abdominal tenderness.   Musculoskeletal:         General: Normal range of motion.      Cervical back: Normal range of motion.     Lymphadenopathy:     He has no cervical adenopathy.   Neurological: He is alert and oriented to person, place, and time. He has normal strength. GCS score is 15. GCS eye subscore is 4. GCS verbal subscore is 5. GCS motor subscore is 6.   Skin: Skin is warm and dry. Capillary refill takes less than 2 seconds.   Psychiatric: He has a normal mood and affect.         Medical Screening Exam   See Full Note    ED Course   Procedures  Labs Reviewed   SARS-COV-2 RNA AMPLIFICATION, QUAL - Abnormal; Notable for the following components:       Result Value    SARS COV-2 Molecular Positive (*)     All other components within normal limits   INFLUENZA A & B BY MOLECULAR - Normal   STREP A BY MOLECULAR METHOD - Normal          Imaging Results    None          Medications - No data to display  Medical Decision Making  At this time, I do not feel that radiology studies or additional lab will add  any benefit to care or diagnostics since there is no reported injury, signs of trauma or signs/sx of severe infection. No profuse or continuous coughing while in ED. I feel that patient has reached maximum benefit from ED evaluation, treatment and observation. I thoroughly explained all findings to patient to the best of my ability and answered all questions to their satisfaction. I educated patient on the general/expected course of the condition and treatment options in ED and after discharge. I also informed patient that our evaluation in the emergency department today is an evaluation of the condition in one moment in time . Informed that if there is any worsening of the condition of if symptoms persist, the patient has been instructed to return to the ED immediately for further evaluation. Patient has also been advised to follow up with PCP once quarantine is over for re-evaluation. Patient verbalized understanding of the instructions and is agreeable to disposition. No questions or concerns at this time.     DX:  COVID        Amount and/or Complexity of Data Reviewed  Independent Historian:      Details: 73-year-old  or black male reports a sore throat and nonproductive cough since this evening.  He is requesting to be checked for COVID.  He denies fever, chills, shortness and breath, chest pain, nausea, vomiting, diarrhea, abdominal pain, dysuria, hematuria.  He is able speak in clear and complete sentences without any difficulty.  Vital signs stable and he is in no acute distress at this time. PMH includes diabetes, hypertension, and bladder cancer.  Denies smoking cigarettes or drinking alcohol.        Labs: ordered.     Details: COVID swab positive  Strep/flu swab negative                                      Clinical Impression:   Final diagnoses:  [U07.1] COVID (Primary)        ED Disposition Condition    Discharge Stable          ED Prescriptions    None       Follow-up Information     None          Sanchez, Danna URIAS NP  06/20/24 0149

## 2024-06-19 NOTE — DISCHARGE INSTRUCTIONS
Quarantine for 5 days from the onset of your symptoms, and if your symptoms are resolving without fever for 24 hours, follow that by 5 days of wearing a mask when around others to minimize the risk of infecting people you encounter.  Please follow with your primary care provider once you are off quarantine.  Take Tylenol 650 mg every 4 hours for pain or fever.  Take Motrin 600 mg every 6 hours for pain or fever.  Increase your fluid intake.  Return to the emergency department for any increase in symptoms or for any other new or worrisome symptoms.

## 2024-06-20 ENCOUNTER — TELEPHONE (OUTPATIENT)
Dept: EMERGENCY MEDICINE | Facility: HOSPITAL | Age: 74
End: 2024-06-20
Payer: COMMERCIAL